# Patient Record
Sex: FEMALE | Race: BLACK OR AFRICAN AMERICAN | Employment: OTHER | URBAN - METROPOLITAN AREA
[De-identification: names, ages, dates, MRNs, and addresses within clinical notes are randomized per-mention and may not be internally consistent; named-entity substitution may affect disease eponyms.]

---

## 2024-03-18 ENCOUNTER — HOME HEALTH ADMISSION (OUTPATIENT)
Age: 59
End: 2024-03-18
Payer: MEDICAID

## 2024-03-20 ENCOUNTER — HOME CARE VISIT (OUTPATIENT)
Age: 59
End: 2024-03-20

## 2024-03-21 ENCOUNTER — HOME CARE VISIT (OUTPATIENT)
Age: 59
End: 2024-03-21
Payer: MEDICAID

## 2024-03-21 VITALS
OXYGEN SATURATION: 96 % | HEART RATE: 90 BPM | DIASTOLIC BLOOD PRESSURE: 78 MMHG | TEMPERATURE: 97.2 F | SYSTOLIC BLOOD PRESSURE: 136 MMHG | RESPIRATION RATE: 14 BRPM

## 2024-03-21 PROCEDURE — G0299 HHS/HOSPICE OF RN EA 15 MIN: HCPCS

## 2024-03-21 ASSESSMENT — ENCOUNTER SYMPTOMS
BOWEL INCONTINENCE: 1
HEMOPTYSIS: 0
STOOL DESCRIPTION: SOFT FORMED

## 2024-03-22 NOTE — HOME HEALTH
Skilled services/Home bound verification:  CAREGIVER UNDERSTAND HOMEBOUND STATUS.         Skilled Reason for admission/summary of clinical condition:   Chronic venous htn w/ulcer RLE /eft Buttocks Pressure injury/ Tube feedings, Patient is NPO   This patient is homebound for the following reasons Requires considerable and taxing effort to leave the home , Requires the assistance of 1 or more persons to leave the home , Only leaves the home for medical reasons or [employer] services and are infrequent and of short duration for other reasons  and Patient is bedridden .    Caregiver: Main Caregiver is debbie veloz .Assists with ADLs, Medication management, Transportation to appointments, Tube Feeds 3x/day,  and assists with Transfers with martir lift, turning and positioning.         Medications reconciled and all medications are available in the home this visit.      The following education was provided regarding medications:  Needs the assistance of one other for safe ambulation.  Medications  are somewhat effective at this time.      High risk medication teaching regarding antiplatelets and  hypoglycemic agents performed.  Patient is on ASA an Antiplatelet,  I discussed the signs of gabriella and occult bleeding, prevention of bleeding when taking antiplatelets. and when to call MD.  Patient also takes both Lantus and Novolog for her diabetes.  I went over the signs and symptoms of hyper/hypoglycemia and the importance of rotating injections.  I also stressed the importance of using a sharps container for used needles and discard with top tightly closed.  Instruct Patient in insulin purpose, side effects, possible complications, administration procedure, and schedule. Instruct on safe handling and storage of insulin and on safe disposal of needles and testing equipment.  I went over the difference between the two insulins.      Home health supplies by type and quantity ordered/delivered this visit include:I brought

## 2024-03-22 NOTE — CASE COMMUNICATION
OC done on [facility].  She is bedbound, last 4-5 months.  She has wounds right calf and left Buttocks.  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  Patient has gastrostomy tube for all feedings/meds.

## 2024-03-25 ENCOUNTER — HOME CARE VISIT (OUTPATIENT)
Age: 59
End: 2024-03-25
Payer: MEDICAID

## 2024-03-25 PROCEDURE — G0300 HHS/HOSPICE OF LPN EA 15 MIN: HCPCS

## 2024-03-27 ENCOUNTER — HOME CARE VISIT (OUTPATIENT)
Age: 59
End: 2024-03-27
Payer: MEDICAID

## 2024-03-27 VITALS
RESPIRATION RATE: 18 BRPM | HEART RATE: 89 BPM | TEMPERATURE: 97 F | DIASTOLIC BLOOD PRESSURE: 74 MMHG | OXYGEN SATURATION: 95 % | SYSTOLIC BLOOD PRESSURE: 128 MMHG

## 2024-03-27 PROCEDURE — G0300 HHS/HOSPICE OF LPN EA 15 MIN: HCPCS

## 2024-03-27 ASSESSMENT — ENCOUNTER SYMPTOMS
BOWEL INCONTINENCE: 1
BOWEL INCONTINENCE: 1

## 2024-03-27 NOTE — HOME HEALTH
Skilled Needs: Education and Wound Care   Caregiver involvement: Pt independent with care with the exception of wound treatment due to location   Medications reviewed and all medications are available in the home this visit.    The following education was provided regarding medications:  DIDI BROUSSARD notified of any discrepancies/look a-like medications/medication interactions: DIDI  Medications are effecrtive at this time.    Home health supplies by type and quantity ordered/delivered this visit include:  Supplies available   Patient education provided this visit:  Reviewed to reported any redness, swelling, warmth, fever, chills, increased heart/respiratory rate, uncontrolled pain/tenderness, drainage:green/yellow/brown, or odor to MD immediately.  Alejandro education provided: DIDI  Patient level of understanding of education provided: Verbalzied all understanding to above education  Skilled Care Performed this visit: Education and Wound Care  Patient response to procedure performed: Minimal pain during dressing change   Agency Progress toward goals: Progressing toward interventions above  Patient's Progress towards personal goals: when patient reaches goals and medication is managed, and disease processes are understood patient agrees and understand that discharge will take place

## 2024-03-29 ENCOUNTER — HOME CARE VISIT (OUTPATIENT)
Age: 59
End: 2024-03-29
Payer: MEDICAID

## 2024-03-29 VITALS
RESPIRATION RATE: 18 BRPM | SYSTOLIC BLOOD PRESSURE: 124 MMHG | DIASTOLIC BLOOD PRESSURE: 70 MMHG | HEART RATE: 78 BPM | OXYGEN SATURATION: 98 %

## 2024-03-29 PROCEDURE — G0300 HHS/HOSPICE OF LPN EA 15 MIN: HCPCS

## 2024-03-29 ASSESSMENT — ENCOUNTER SYMPTOMS: BOWEL INCONTINENCE: 1

## 2024-03-29 NOTE — HOME HEALTH
Skilled Needs: Education and Wound Care   Caregiver involvement: Pt dependent with care with due to DX   Medications reviewed and all medications are available in the home this visit.    The following education was provided regarding medications:  DIDI BROUSSARD notified of any discrepancies/look a-like medications/medication interactions: DIDI  Medications are effecrtive at this time.    Home health supplies by type and quantity ordered/delivered this visit include:  Supplies available   Patient education provided this visit:  Reviewed to reported any redness, swelling, warmth, fever, chills, increased heart/respiratory rate, uncontrolled pain/tenderness, drainage:green/yellow/brown, or odor to MD immediately. Wound care completed to right leg CG completed treatment to her bottom during clean up and brief change. Glucose level 145 per CG at this visit. Pt has supplies. Pt is due to go to the Wound clinic on monday set schedule for next week.   Sharps education provided: Clinician instructed patient/CG on proper disposal of sharps: Containers should be made of hard plastic, be puncture-resistant and leakproof, such as a laundry detergent or bleach bottle.  When the container is ¾ full, it should be sealed with tape and labeled DO NOT RECYCLE prior to discarding in the regular trash.    Patient level of understanding of education provided: Verbalzied all understanding to above education  Skilled Care Performed this visit: Education and Wound Care  Patient response to procedure performed: Minimal pain during dressing change   Agency Progress toward goals: Progressing toward interventions above  Patient's Progress towards personal goals: when patient reaches goals and medication is managed, and disease processes are understood patient agrees and understand that discharge will take place

## 2024-04-01 ENCOUNTER — HOME CARE VISIT (OUTPATIENT)
Age: 59
End: 2024-04-01
Payer: MEDICAID

## 2024-04-03 ENCOUNTER — HOME CARE VISIT (OUTPATIENT)
Age: 59
End: 2024-04-03
Payer: MEDICAID

## 2024-04-03 VITALS
HEART RATE: 98 BPM | SYSTOLIC BLOOD PRESSURE: 122 MMHG | RESPIRATION RATE: 18 BRPM | DIASTOLIC BLOOD PRESSURE: 74 MMHG | OXYGEN SATURATION: 90 %

## 2024-04-03 PROCEDURE — G0300 HHS/HOSPICE OF LPN EA 15 MIN: HCPCS

## 2024-04-03 ASSESSMENT — ENCOUNTER SYMPTOMS: BOWEL INCONTINENCE: 1

## 2024-04-04 NOTE — HOME HEALTH
Skilled Needs: Education and Wound Care   Caregiver involvement: Pt dependent with care due to dx and location of wounds    Medications reviewed and all medications are available in the home this visit.    The following education was provided regarding medications:  DIDI BROUSSARD notified of any discrepancies/look a-like medications/medication interactions: DIDI  Medications are effecrtive at this time.    Home health supplies by type and quantity ordered/delivered this visit include:  Supplies available   Patient education provided this visit:  Reviewed to reported any redness, swelling, warmth, fever, chills, increased heart/respiratory rate, uncontrolled pain/tenderness, drainage:green/yellow/brown, or odor to MD immediately. PT seen by wound clinic on monday.   Alejandro education provided: DIDI  Patient level of understanding of education provided: Verbalzied all understanding to above education  Skilled Care Performed this visit: Education and Wound Care  Patient response to procedure performed: Minimal pain during dressing change   Agency Progress toward goals: Progressing toward interventions above  Patient's Progress towards personal goals: when patient reaches goals and medication is managed, and disease processes are understood patient agrees and understand that discharge will take place

## 2024-04-05 ENCOUNTER — HOME CARE VISIT (OUTPATIENT)
Age: 59
End: 2024-04-05
Payer: MEDICAID

## 2024-04-05 VITALS
RESPIRATION RATE: 18 BRPM | OXYGEN SATURATION: 98 % | SYSTOLIC BLOOD PRESSURE: 108 MMHG | DIASTOLIC BLOOD PRESSURE: 68 MMHG | TEMPERATURE: 98 F | HEART RATE: 76 BPM

## 2024-04-05 PROCEDURE — G0494 LPN CARE EA 15MIN HH/HOSPICE: HCPCS

## 2024-04-05 PROCEDURE — G0495 RN CARE TRAIN/EDU IN HH: HCPCS

## 2024-04-05 PROCEDURE — G0300 HHS/HOSPICE OF LPN EA 15 MIN: HCPCS

## 2024-04-05 NOTE — HOME HEALTH
Skilled reason for visit: Education/Wound care    Caregiver involvement: Pt dependent in care with the exception of wound due to location     Medications reviewed and all medications are available in the home this visit.    The following education was provided regarding medications:  N/A  MD notified of any discrepancies/look a-like medications/medication interactions: N/A  Medications are effective at this time.  Y    Home health supplies by type and quantity ordered/delivered this visit include: Supplies available    Patient education provided this visit: educated to increase protein intake to help with wound healing.   Sharps education provided: NA    Patient level of understanding of education provided: PT verbalized understanding of all education provided by repeating back high protein diet needs     Patient response to procedure performed:  effective    Agency Progress toward goals: Progressising towards goals from start of care    Patient's Progress towards personal goals: Patient continues wound clinic visits and progresses towards goals    Home exercise program: N/A    Continued need for the following skills: Nursing and wound care    Plan for next visit: Wound care and education    Patient and/or caregiver notified and agrees to changes in the Plan of Care: yes    The following discharge planning was discussed with the pt/caregiver: N/A

## 2024-04-08 ENCOUNTER — HOME CARE VISIT (OUTPATIENT)
Age: 59
End: 2024-04-08
Payer: MEDICAID

## 2024-04-08 PROCEDURE — G0300 HHS/HOSPICE OF LPN EA 15 MIN: HCPCS

## 2024-04-09 VITALS
RESPIRATION RATE: 18 BRPM | SYSTOLIC BLOOD PRESSURE: 110 MMHG | HEART RATE: 98 BPM | OXYGEN SATURATION: 95 % | DIASTOLIC BLOOD PRESSURE: 88 MMHG | TEMPERATURE: 97.3 F

## 2024-04-09 NOTE — HOME HEALTH
Skilled reason for visit: Education/Wound care    Caregiver involvement: Pt dependent in care with the exception of wound due to location     Medications reviewed and all medications are available in the home this visit.    The following education was provided regarding medications:  N/A  MD notified of any discrepancies/look a-like medications/medication interactions: N/A  Medications are effective at this time.  Y    Home health supplies by type and quantity ordered/delivered this visit include: Supplies available    Patient education provided this visit: educted to increase protein intake to help with wound healing. Educated on pressure relief for wound healing.     Sharps education provided: NA    Patient level of understanding of education provided: PT caregiver verbalized understanding of all education provided by repeating back high protein diet needs     Patient response to procedure performed:  effective    Agency Progress toward goals: Progressising towards goals from start of care    Patient's Progress towards personal goals: Patient continues wound clinic visits and progresses towards goals    Home exercise program: N/A    Continued need for the following skills: Nursing and wound care    Plan for next visit: Wound care and education    Patient and/or caregiver notified and agrees to changes in the Plan of Care: yes     The following discharge planning was discussed with the pt/caregiver: N/A

## 2024-04-10 ENCOUNTER — HOME CARE VISIT (OUTPATIENT)
Age: 59
End: 2024-04-10
Payer: MEDICAID

## 2024-04-15 ENCOUNTER — HOME CARE VISIT (OUTPATIENT)
Age: 59
End: 2024-04-15
Payer: MEDICAID

## 2024-04-18 ENCOUNTER — HOME CARE VISIT (OUTPATIENT)
Age: 59
End: 2024-04-18
Payer: MEDICAID

## 2024-04-18 VITALS
DIASTOLIC BLOOD PRESSURE: 78 MMHG | TEMPERATURE: 96 F | SYSTOLIC BLOOD PRESSURE: 132 MMHG | RESPIRATION RATE: 16 BRPM | HEART RATE: 95 BPM | OXYGEN SATURATION: 95 %

## 2024-04-18 PROCEDURE — G0300 HHS/HOSPICE OF LPN EA 15 MIN: HCPCS

## 2024-04-18 ASSESSMENT — ENCOUNTER SYMPTOMS: BOWEL INCONTINENCE: 1

## 2024-04-18 NOTE — HOME HEALTH
Skilled Needs: Education and Wound Care   Caregiver involvement: Pt independent with care with the exception of wound treatment due to location   Medications reviewed and all medications are available in the home this visit.    The following education was provided regarding medications:  DIDI BROUSSARD notified of any discrepancies/look a-like medications/medication interactions: DIDI  Medications are effecrtive at this time.    Home health supplies by type and quantity ordered/delivered this visit include:  Supplies available   Patient education provided this visit:  Reviewed to reported any redness, swelling, warmth, fever, chills, increased heart/respiratory rate, uncontrolled pain/tenderness, drainage:green/yellow/brown, or odor to MD immediately. WOund care compeleted as ordered with miniml pain verbalziex at this visit. No change in medication or wound care treatment at this time Pt due to see wound clinic [facility] on the 29th of April.  Sharps education provided: Clinician instructed patient/CG on proper disposal of sharps: Containers should be made of hard plastic, be puncture-resistant and leakproof, such as a laundry detergent or bleach bottle.  When the container is ¾ full, it should be sealed with tape and labeled DO NOT RECYCLE prior to discarding in the regular trash.    Patient level of understanding of education provided: Verbalzied all understanding to above education  Skilled Care Performed this visit: Education and Wound Care  Patient response to procedure performed: Minimal pain during dressing change   Agency Progress toward goals: Progressing toward interventions above  Patient's Progress towards personal goals: when patient reaches goals and medication is managed, and disease processes are understood patient agrees and understand that discharge will take place

## 2024-04-22 ENCOUNTER — HOME CARE VISIT (OUTPATIENT)
Age: 59
End: 2024-04-22
Payer: MEDICAID

## 2024-04-22 VITALS
HEART RATE: 98 BPM | OXYGEN SATURATION: 96 % | TEMPERATURE: 97 F | RESPIRATION RATE: 16 BRPM | DIASTOLIC BLOOD PRESSURE: 70 MMHG | SYSTOLIC BLOOD PRESSURE: 128 MMHG

## 2024-04-22 PROCEDURE — G0300 HHS/HOSPICE OF LPN EA 15 MIN: HCPCS

## 2024-04-22 ASSESSMENT — ENCOUNTER SYMPTOMS: BOWEL INCONTINENCE: 1

## 2024-04-22 NOTE — HOME HEALTH
Skilled Needs: Education and Wound Care   Caregiver involvement: Pt dependent in care due to DX   Medications reviewed and all medications are available in the home this visit.    The following education was provided regarding medications:  DIDI BROUSSARD notified of any discrepancies/look a-like medications/medication interactions: NA  Medications are effecrtive at this time.    Home health supplies by type and quantity ordered/delivered this visit include:  Supplies available   Patient education provided this visit:  Reviewed to reported any redness, swelling, warmth, fever, chills, increased heart/respiratory rate, uncontrolled pain/tenderness, drainage:green/yellow/brown, or odor to MD immediately. Wound care completed a ordered. Minimal pain verbalzied during dressing change.   Sharps education provided: Clinician instructed patient/CG on proper disposal of sharps: Containers should be made of hard plastic, be puncture-resistant and leakproof, such as a laundry detergent or bleach bottle.  When the container is ¾ full, it should be sealed with tape and labeled DO NOT RECYCLE prior to discarding in the regular trash.    Patient level of understanding of education provided: Verbalzied all understanding to above education  Skilled Care Performed this visit: Education and Wound Care  Patient response to procedure performed: Minimal pain during dressing change   Agency Progress toward goals: Progressing toward interventions above  Patient's Progress towards personal goals: when patient reaches goals and medication is managed, and disease processes are understood patient agrees and understand that discharge will take place

## 2024-04-25 ENCOUNTER — HOME CARE VISIT (OUTPATIENT)
Age: 59
End: 2024-04-25
Payer: MEDICAID

## 2024-04-25 VITALS
RESPIRATION RATE: 18 BRPM | HEART RATE: 65 BPM | DIASTOLIC BLOOD PRESSURE: 70 MMHG | TEMPERATURE: 98 F | OXYGEN SATURATION: 95 % | SYSTOLIC BLOOD PRESSURE: 120 MMHG

## 2024-04-25 PROCEDURE — G0300 HHS/HOSPICE OF LPN EA 15 MIN: HCPCS

## 2024-04-25 ASSESSMENT — ENCOUNTER SYMPTOMS: BOWEL INCONTINENCE: 1

## 2024-04-25 NOTE — HOME HEALTH
Skilled Needs: Education and Wound Care   Caregiver involvement: Pt dependent in care due to DX    Medications reviewed and all medications are available in the home this visit.    The following education was provided regarding medications:  DIDI BROUSSARD notified of any discrepancies/look a-like medications/medication interactions: DIDI  Medications are effecrtive at this time.    Home health supplies by type and quantity ordered/delivered this visit include:  Supplies available   Patient education provided this visit:  Reviewed to reported any redness, swelling, warmth, fever, chills, increased heart/respiratory rate, uncontrolled pain/tenderness, drainage:green/yellow/brown, or odor to MD immediately. Wound care completed as ordered no s/s of infection noted this visit. Pt due to see wound clinic next week. will only need nursing 1x next week.   Sharps education provided: Clinician instructed patient/CG on proper disposal of sharps: Containers should be made of hard plastic, be puncture-resistant and leakproof, such as a laundry detergent or bleach bottle.  When the container is ¾ full, it should be sealed with tape and labeled DO NOT RECYCLE prior to discarding in the regular trash.      Patient level of understanding of education provided: Verbalzied all understanding to above education  Skilled Care Performed this visit: Education and Wound Care  Patient response to procedure performed: Minimal pain during dressing change   Agency Progress toward goals: Progressing toward interventions above  Patient's Progress towards personal goals: when patient reaches goals and medication is managed, and disease processes are understood patient agrees and understand that discharge will take place

## 2024-04-27 ENCOUNTER — HOME CARE VISIT (OUTPATIENT)
Age: 59
End: 2024-04-27
Payer: MEDICAID

## 2024-04-29 ENCOUNTER — HOME CARE VISIT (OUTPATIENT)
Age: 59
End: 2024-04-29
Payer: MEDICAID

## 2024-05-09 ENCOUNTER — HOME CARE VISIT (OUTPATIENT)
Age: 59
End: 2024-05-09
Payer: MEDICAID

## 2024-05-11 ENCOUNTER — HOME CARE VISIT (OUTPATIENT)
Age: 59
End: 2024-05-11
Payer: MEDICAID

## 2024-05-11 PROCEDURE — G0299 HHS/HOSPICE OF RN EA 15 MIN: HCPCS

## 2024-05-12 VITALS
OXYGEN SATURATION: 94 % | SYSTOLIC BLOOD PRESSURE: 142 MMHG | TEMPERATURE: 97 F | HEART RATE: 68 BPM | RESPIRATION RATE: 16 BRPM | DIASTOLIC BLOOD PRESSURE: 84 MMHG

## 2024-05-13 NOTE — HOME HEALTH
Summary of clinical health condition:   59 /yo female re-admitted to: [facility] 4/26/24-5/6/2024 Dx: Acute metabolic encephalopathy with uncontrolled diabetes type 2.    This patient is homebound for the following reasons Requires considerable and taxing effort to leave the home  and Only leaves the home for medical reasons or [employer] services and are infrequent and of short duration for other reasons.    Caregiver: CG/mother, PCA, other family members who assist with ADL's, assist with daily medications, prepare daily meals, run errands, groceries and accompanied  MD appt. prn.     Skilled care provided: Teaching disease and medication management, performed coccyx, R buttock and RLL wound care (see woun addendum), Lyons cath (5/7) draining with good amt of clear  yellow/urine, PEG tube site clean and intact, CG/mother & PCA independent with PEG tube care and bolus feeding. Completed JORDAN assessment, SNV freq and D/C plan.   ( Aquacel Ag continued from hospital treatment used 5/7/24).  Patient response to procedure performed: Pt tolerated well during the assessment procedure with no c/o.    Medications reviewed and all medications are available at home. The following education was provided regarding medications, medication interactions, and look a like medications: N/A, all med's reviewed. Discussed importance of timely taking all prescribed med's, proper dosage and freq. Discussed s/e with Aspirin; abdominal cramps, bloody or black and tarry stool, bloody/dark urine, chest pain discomfort, confusion and constipation. CG to notify MD if any of these s/s present. Medications are somewhat effective at this time.     High risk medication teaching regarding anticoagulants, hyperglycemic agents or opoid narcotics performed (specify): Antiplatelet teaching provided as mentioned above.    MD notified of any discrepancies/look a like medications/medication interactions N/A    Home health supplies by type and quantity

## 2024-05-14 ENCOUNTER — HOME CARE VISIT (OUTPATIENT)
Age: 59
End: 2024-05-14
Payer: MEDICAID

## 2024-05-14 PROCEDURE — G0300 HHS/HOSPICE OF LPN EA 15 MIN: HCPCS

## 2024-05-15 VITALS
OXYGEN SATURATION: 95 % | RESPIRATION RATE: 16 BRPM | DIASTOLIC BLOOD PRESSURE: 78 MMHG | HEART RATE: 106 BPM | SYSTOLIC BLOOD PRESSURE: 120 MMHG

## 2024-05-15 ASSESSMENT — ENCOUNTER SYMPTOMS: BOWEL INCONTINENCE: 1

## 2024-05-15 NOTE — HOME HEALTH
Skilled Needs: Education and Wound Care   Caregiver involvement: Pt dependent in all care due to DX    Medications reviewed and all medications are available in the home this visit.    The following education was provided regarding medications:  DIDI BROUSSARD notified of any discrepancies/look a-like medications/medication interactions: DIDI  Medications are effecrtive at this time.    Home health supplies by type and quantity ordered/delivered this visit include:  Supplies available   Patient education provided this visit:  Reviewed to reported any redness, swelling, warmth, fever, chills, increased heart/respiratory rate, uncontrolled pain/tenderness, drainage:green/yellow/brown, or odor to MD immediately. Pt currently has a jacobsen and wounds on her backside and her right leg. Pt has supplies at this time for wound care. CG continues to monitor glucose levels and administers medication as needed.   Sharps education provided: Clinician instructed patient/CG on proper disposal of sharps: Containers should be made of hard plastic, be puncture-resistant and leakproof, such as a laundry detergent or bleach bottle.  When the container is ¾ full, it should be sealed with tape and labeled DO NOT RECYCLE prior to discarding in the regular trash.    Patient level of understanding of education provided: Verbalzied all understanding to above education  Skilled Care Performed this visit: Education and Wound Care  Patient response to procedure performed: Minimal pain during dressing change   Agency Progress toward goals: Progressing toward interventions above  Patient's Progress towards personal goals: when patient reaches goals and medication is managed, and disease processes are understood patient agrees and understand that discharge will take place

## 2024-05-17 ENCOUNTER — HOME CARE VISIT (OUTPATIENT)
Age: 59
End: 2024-05-17
Payer: MEDICAID

## 2024-05-17 VITALS
TEMPERATURE: 98.5 F | SYSTOLIC BLOOD PRESSURE: 128 MMHG | RESPIRATION RATE: 18 BRPM | DIASTOLIC BLOOD PRESSURE: 72 MMHG | OXYGEN SATURATION: 98 % | HEART RATE: 67 BPM

## 2024-05-17 PROCEDURE — G0300 HHS/HOSPICE OF LPN EA 15 MIN: HCPCS

## 2024-05-17 ASSESSMENT — ENCOUNTER SYMPTOMS: BOWEL INCONTINENCE: 1

## 2024-05-18 ENCOUNTER — HOME CARE VISIT (OUTPATIENT)
Age: 59
End: 2024-05-18
Payer: MEDICAID

## 2024-05-18 PROCEDURE — G0299 HHS/HOSPICE OF RN EA 15 MIN: HCPCS

## 2024-05-19 VITALS
TEMPERATURE: 98 F | SYSTOLIC BLOOD PRESSURE: 110 MMHG | HEART RATE: 100 BPM | RESPIRATION RATE: 18 BRPM | OXYGEN SATURATION: 94 % | DIASTOLIC BLOOD PRESSURE: 68 MMHG

## 2024-05-19 NOTE — HOME HEALTH
Physician Notification and Justification to Continue Services:     Justification for continued intermittent care: patient with wound care concerns as follows requiring skilled care for wound care, PEG tube and Beyer care/management.    Cassie Patel NP notified of the following: the need for continued Skilled Nursing home health services for above reasons, plan of care including visit frequency of 3w8, 2 prn Skilled Nursing for : additional assessment and education on wound care/management, PEG tube care, disease and medication management, medication concerns as follows: administering insulin on timely basis and monitoring BG and additional assessment for wound improvement, healing complication including BEYER cath and monitor for s/s. Service to continue q M/W/F.      Skilled reason for visit: Assessment for recert,  disease and medication management,  Medications reviewed and all listed medications are available at home.  The following education was provided regarding medications, medication interactions, and look a like medications: all med's reviewed. Discussed importance of timely taking all med's, proper dosage and freq. Medications are effective at this time.     Caregiver: CG/mother/ PCA who assist with daily meals, ADL's,  assist/ provided reminders with daily medication, run errands groceries and accompany to MD appt.prn      Patient education provided this visit to include: Reviewed infection prevention; hand washing, using hand  when touching contaminated items and avoiding sick person. Repositioning q 2 hrs and elevating RLE above the heart to avoid touching wound site. Keeping PEG tube site and clean and change dressing daily. Elevating HOB 45 degrees during bolus feeding. Monitor daily BG before bolus feeding, continue feeding 4x/daily with freed H20 200 ml bet bolus. Continue to monitor for S/S of infection; fever 100.4, increase pain, redness, increased swelling, coughing with yellow thick

## 2024-05-21 ENCOUNTER — HOME CARE VISIT (OUTPATIENT)
Age: 59
End: 2024-05-21
Payer: MEDICAID

## 2024-05-21 PROCEDURE — G0299 HHS/HOSPICE OF RN EA 15 MIN: HCPCS

## 2024-05-21 ASSESSMENT — ENCOUNTER SYMPTOMS: HEMOPTYSIS: 0

## 2024-05-22 VITALS
RESPIRATION RATE: 18 BRPM | SYSTOLIC BLOOD PRESSURE: 108 MMHG | HEART RATE: 110 BPM | OXYGEN SATURATION: 98 % | TEMPERATURE: 98 F | DIASTOLIC BLOOD PRESSURE: 70 MMHG

## 2024-05-22 ASSESSMENT — ENCOUNTER SYMPTOMS: STOOL DESCRIPTION: LOOSE

## 2024-05-23 ENCOUNTER — HOME CARE VISIT (OUTPATIENT)
Age: 59
End: 2024-05-23
Payer: MEDICAID

## 2024-05-23 PROCEDURE — G0299 HHS/HOSPICE OF RN EA 15 MIN: HCPCS

## 2024-05-23 ASSESSMENT — ENCOUNTER SYMPTOMS: BOWEL INCONTINENCE: 1

## 2024-05-23 NOTE — HOME HEALTH
Skilled reason for visit: Assessment for recert, disease and medication management,    Medications reviewed and all listed medications are available at home.  The following education was provided regarding medications, medication interactions, and look a like medications: all med's reviewed. Discussed importance of timely taking all med's, proper dosage and freq. Medications are effective at this time.     Caregiver: CG/mother/ PCA who assist with daily meals, ADL's,  assist/ provided reminders with daily medication, run errands groceries and accompany to MD appt.prn   Patient education provided this visit to include: Reviewed infection prevention; hand washing, using hand  when touching contaminated items and avoiding sick person. Reviewed repositioning q 2 hrs and elevating RLE above the heart to avoid touching wound site. CG to change sacral wound dressing when soiled prn. Monitor daily BG before bolus feeding, continue feeding 4x/daily with freed H20 200 ml bet bolus. Continue to monitor for S/S of infection; fever 100.4, increase pain, redness, increased swelling, coughing with yellow thick sputum, cloudy urine with strong odor, purulent wound drainage with foul smell, not feeling well 2-3 days, SOB, and to call [facility] or MD for assistance if experiencing any of these S/S. To call 911 with chest pains, facial drooping, difficulty talking, non arousable/unconscious and uncontrollable bleeding.    Sharps education provided: Completed teaching.  Patient/caregiver degree of understanding: Pt/CG has good understanding of the teaching provided during visit.    Skilled care provided: Completed assessment, performed PEG tube care, changed RLL wound and sacral wound dressing (see wound addendum). Wound measured and photographed.  Pt. tolerated well during the assessment and procedure with no C/O.   Continue skilled care to provide: SN  Home health supplies by type and quantity ordered/delivered this visit

## 2024-05-25 ENCOUNTER — HOME CARE VISIT (OUTPATIENT)
Age: 59
End: 2024-05-25
Payer: MEDICAID

## 2024-05-25 VITALS
DIASTOLIC BLOOD PRESSURE: 82 MMHG | SYSTOLIC BLOOD PRESSURE: 132 MMHG | HEART RATE: 78 BPM | RESPIRATION RATE: 18 BRPM | OXYGEN SATURATION: 97 % | TEMPERATURE: 97 F

## 2024-05-25 PROCEDURE — G0299 HHS/HOSPICE OF RN EA 15 MIN: HCPCS

## 2024-05-25 ASSESSMENT — ENCOUNTER SYMPTOMS
HEMOPTYSIS: 0
BOWEL INCONTINENCE: 1

## 2024-05-25 NOTE — HOME HEALTH
Skilled reason for visit: Assessment, disease and medication management, Lyons cath changed, RLL and sacral wound care.     Medications reviewed and all listed medications are available at home. The following education was provided regarding medications, medication interactions, and look a like medications: all med's reviewed. Discussed importance of timely taking all med's, proper dosage and freq. Medications are effective at this time.      Caregiver: CG/mother/ PCA who assist with daily meals, ADL's, assist/ provided reminders with daily medication, run errands groceries and accompany to MD appt.prn     Patient education provided this visit to include: Reviewed infection prevention; hand washing, using hand  when touching contaminated items and avoiding sick person. Reviewed proper Lyons cath care, repositioning q 2 hrs and elevating RLE above the heart to avoid touching wound site. CG to change sacral wound dressing when soiled prn. Monitor daily BG before bolus feeding, continue feeding 4x/daily with freed H20 200 ml bet bolus. Continue to monitor for S/S of infection; fever 100.4, increase pain, redness, increased swelling, coughing with yellow thick sputum, cloudy urine with strong odor, purulent wound drainage with foul smell, not feeling well 2-3 days, SOB, and to call [facility] or MD for assistance if experiencing any of these S/S. To call 911 with chest pains, facial drooping, difficulty talking, non arousable/unconscious and uncontrollable bleeding.    Sharps education provided: Completed teaching.    Patient/caregiver degree of understanding: Pt/CG has good understanding of the teaching provided during visit.    Skilled care provided: Completed assessment, Lyons cath changed 16 fr with 10 ml NS baloon following sterile technique procedure, performed PEG tube care, changed RLL wound and sacral wound dressing (see wound addendum).  Pt health condition stable and s/s of infection.    Pt. tolerated well

## 2024-05-26 VITALS
HEART RATE: 98 BPM | TEMPERATURE: 97 F | DIASTOLIC BLOOD PRESSURE: 79 MMHG | OXYGEN SATURATION: 97 % | RESPIRATION RATE: 18 BRPM | SYSTOLIC BLOOD PRESSURE: 110 MMHG

## 2024-05-27 ENCOUNTER — HOME CARE VISIT (OUTPATIENT)
Age: 59
End: 2024-05-27
Payer: MEDICAID

## 2024-05-27 VITALS
RESPIRATION RATE: 15 BRPM | SYSTOLIC BLOOD PRESSURE: 116 MMHG | HEART RATE: 95 BPM | DIASTOLIC BLOOD PRESSURE: 70 MMHG | TEMPERATURE: 97.6 F | OXYGEN SATURATION: 95 %

## 2024-05-27 PROCEDURE — G0299 HHS/HOSPICE OF RN EA 15 MIN: HCPCS

## 2024-05-27 ASSESSMENT — ENCOUNTER SYMPTOMS: BOWEL INCONTINENCE: 1

## 2024-05-27 NOTE — HOME HEALTH
Skilled reason for visit: wound care and peg care  daughter reported that peg tube has been leaking since end of last week. upon assessment multiple split 4 x4 heavily saturated with what appears to be the Glucerna. the daughter reported that bolus feeding was given prior to visit & throughout the weekend. was in bed at 90 degrees upon arrival and daughter reported that HOB stays at 90 degrees. right below left Breast/Left upper abdomen small wound as skin slid off when removed saturated towel that daughter had been covering peg tube d/t leaking. cleansed area and applied DSD. sacral area  not addressed as 911 was called and sent to hospital.  Caregiver involvement: daughter perform tube feeding, ADLS/MED and jacobsen management.    Medications reviewed and all medications are available in the home this visit.    The following education was provided regarding medications:  reviewed all medication bottles in home for frequency, side effects and precautions. verbalized understanding and repeat back        Medications are effective at this time.      Home health supplies by type and quantity ordered/delivered this visit include: wound care supplies in the home    Patient education provided this visit:see intervention tab.      Patient level of understanding of education provided: see intervention tab.      Patient response to procedure performed:  tolerated wound care with noticeable grimace of RLE wound care.    Agency Progress toward goals: see intervention tab for progressing toward goals        Patient's Progress towards personal goals: see intervention tab for progressing toward goals        Home exercise program: go to ER for peg eval 911.    Continued need for the following skills: Nursing.    Plan for next visit: wound care    Patient and/or caregiver notified and agrees to changes in the Plan of Care: N/A.     The following discharge planning was discussed with the pt/caregiver: when all jacobsen/wound care/peg

## 2024-05-29 ENCOUNTER — HOME CARE VISIT (OUTPATIENT)
Age: 59
End: 2024-05-29
Payer: MEDICAID

## 2024-06-07 ENCOUNTER — HOME CARE VISIT (OUTPATIENT)
Age: 59
End: 2024-06-07
Payer: MEDICAID

## 2024-06-07 VITALS
RESPIRATION RATE: 22 BRPM | DIASTOLIC BLOOD PRESSURE: 72 MMHG | SYSTOLIC BLOOD PRESSURE: 116 MMHG | TEMPERATURE: 98.9 F | OXYGEN SATURATION: 96 % | HEART RATE: 98 BPM

## 2024-06-07 PROCEDURE — G0299 HHS/HOSPICE OF RN EA 15 MIN: HCPCS

## 2024-06-07 ASSESSMENT — ENCOUNTER SYMPTOMS
BOWEL INCONTINENCE: 1
TROUBLE SWALLOWING: 1

## 2024-06-08 ENCOUNTER — HOME CARE VISIT (OUTPATIENT)
Age: 59
End: 2024-06-08
Payer: MEDICAID

## 2024-06-08 NOTE — HOME HEALTH
Skilled reason for visit: [facility]MIT ON [facility] 24-24 SEPSIS \"\"This is a 58-year-old female who has a history of stroke intracranial hemorrhage in 2023, nonverbal due to Stroke, residual left-sided hemiplegia presents with left-sided chest pain. Patient is point to the left side of her chest. Pain appears to be reproducible on exam. History is unobtainable as patient is nonverbal. Will obtain labs including troponin x 2, CTA of her chest. Patient had a recent negative nuclear stress test I n 2023. Troponin x 2 is negative. CT of the chest shows chronic pulmonary emboli nothing acute. Also noted malposition gastric tube. This was replaced in the ED. I had a long discussion with the patient's mother regarding anticoagulation. Given her intracranial hemorrhage in 2023 there is some risk to restarting her anticoagulation. At this time mother wishes to defer starting anticoagulation will follow-up with her primary care physician. Of note the patient is tachycardic 100-110. On the records reviewed patient was recent discharge from the hospital with a heart rate of approximate 100 so this appears to be baseline for her. \" Everything is back to normal per Aidee. Rash on tongue is gone. Wound is on the right calf. Missed 2024 due to 's , but new appointment with [facility] on 3/18/2024. May have to go weekly if no one is coming to the house.\"    Caregiver involvement: daughter and mother performs all ADLS/tube feeding turning. will learn wound care and iv administration. nurse demonstrated iv CADD pump bag change 3 time and demonstrated t flush red port daily with NS/heparin and infection control before connecting with mother. . verbalized understanding but needs reinforcement. mother reported \"my daughter is probably a better learner. will be present for tomorrow visit.      Medications reviewed and all medications are available in the home this visit.    The following education was

## 2024-06-09 ENCOUNTER — HOME CARE VISIT (OUTPATIENT)
Age: 59
End: 2024-06-09
Payer: MEDICAID

## 2024-06-09 PROCEDURE — G0299 HHS/HOSPICE OF RN EA 15 MIN: HCPCS

## 2024-06-10 ENCOUNTER — HOME CARE VISIT (OUTPATIENT)
Age: 59
End: 2024-06-10
Payer: MEDICAID

## 2024-06-10 ENCOUNTER — HOSPITAL ENCOUNTER (OUTPATIENT)
Facility: HOSPITAL | Age: 59
Setting detail: SPECIMEN
Discharge: HOME | End: 2024-06-13

## 2024-06-10 VITALS
TEMPERATURE: 98.7 F | HEART RATE: 97 BPM | OXYGEN SATURATION: 99 % | SYSTOLIC BLOOD PRESSURE: 125 MMHG | DIASTOLIC BLOOD PRESSURE: 50 MMHG | RESPIRATION RATE: 16 BRPM

## 2024-06-10 LAB — SENTARA SPECIMEN COLLECTION: NORMAL

## 2024-06-10 PROCEDURE — 99001 SPECIMEN HANDLING PT-LAB: CPT

## 2024-06-10 PROCEDURE — G0299 HHS/HOSPICE OF RN EA 15 MIN: HCPCS

## 2024-06-10 ASSESSMENT — ENCOUNTER SYMPTOMS
TROUBLE SWALLOWING: 1
BOWEL INCONTINENCE: 1

## 2024-06-10 NOTE — HOME HEALTH
Skilled reason for visit: wound/IV care and disease teaching    Caregiver involvement: mother and niece provide all ADLS, enteral feeding, jacobsen and med management    Medications reviewed and all medications are available in the home this visit.    The following education was provided regarding medications:  reviewed all medication bottles in home for frequency, side effects and precautions. verbalized understanding        Medications are effective, too early to assess effectiveness (antibiotics). at this time.      Home health supplies by type and quantity ordered/delivered this visit include: wound/IV/jacobsen supplies in the home    Patient education provided this visit: see intervention tab.          Patient level of understanding of education provided: see intervention tab.      Patient response to procedure performed:  during wound care moaning and facial grimacing.        went back to ER after visit due to wound declining odor and measurements increased. Undermining around the entire wound. upon arrival feces was in the wound and nijovan Hoskins was educated on the importance of keeping the wound dressing clean and free of stool. nijovan reported that \"doing her best when cleaning up from feces.\" demonstrated wound care to the coccyx and was advised to change wound if has a bowelmovent.verbalized understanding      Continued need for the following skills: Nursing.    Plan for next visit: wound care    Patient and/or caregiver notified and agrees to changes in the Plan of Care: N/A.     The following discharge planning was discussed with the pt/caregiver: Will discharge when all wound/IV care disease process, complication and dietary goals are met and understands all medication purpose/frequencies/side effects

## 2024-06-11 ENCOUNTER — HOME CARE VISIT (OUTPATIENT)
Age: 59
End: 2024-06-11
Payer: MEDICAID

## 2024-06-12 VITALS
OXYGEN SATURATION: 97 % | RESPIRATION RATE: 20 BRPM | DIASTOLIC BLOOD PRESSURE: 80 MMHG | SYSTOLIC BLOOD PRESSURE: 130 MMHG | TEMPERATURE: 98 F | HEART RATE: 100 BPM

## 2024-06-12 ASSESSMENT — ENCOUNTER SYMPTOMS
STOOL DESCRIPTION: LOOSE
HEMOPTYSIS: 0

## 2024-06-12 NOTE — HOME HEALTH
Skilled reason for visit: 59-year old female who is being seen due to sepsis, has jacobsen catheter, peg tube, PICC line and multiple wounds.  Patient's neice is changing out IV medication bag every 24 hours, not present during this visit, but mother states that she is doing okay with that.  Wound care performed, patient showed signs of discomfort when turning.  Had incontinent episode during visit.  Mother stated that they are doing good with peg tube administration.    Caregiver involvement: Mother and other family members, able to assist with any needs that may arise-ADL's, medications, meal preparation, transportation to MD appointments, PICC care, total care required.  Medications reviewed and all medications are available in the home this visit.    The following education was provided regarding medications:  side effects,dosages, purposes, frequencies (patient knowledgeable regarding medications).    MD notified of any discrepancies/look a-like medications/medication interactions: No issues found  Medications are effective at this time.    Home health supplies by type and quantity ordered/delivered this visit include: Admission nurse ordered supplies, supplies in home as well.  Patient education provided this visit:   INSTRUCTED PATIENT AND CG THAT SHOULD ANY NEEDS OR CONCERNS ARISE TO FIRST CALL OUR OFFICE, OR THE DR'S OFFICE  OR GO TO AN URGENT CARE CENTER AND NOT TO THE ED FOR NON-LIFE THREATENING EVENTS. IF IT IS LIFE THREATENING THEN CALL 911 OR GO TO THE CLOSEST ER.  Patient is a fall risk. Educated pateint to sit on the side of the chair/bed, take a slow deep breaths, have feet firmly planted before standing up, use cane/walker if available, or have someone to assist.  encouraged patient to get three nutritional meals daily and to stay hydrated, drink plenty of fluids.  patient made aware to monitor for s/s of infection [increased swelling, increased redness around site, increased pain, foul smelling

## 2024-06-20 ENCOUNTER — HOME CARE VISIT (OUTPATIENT)
Age: 59
End: 2024-06-20
Payer: MEDICAID

## 2024-06-20 PROCEDURE — G0299 HHS/HOSPICE OF RN EA 15 MIN: HCPCS

## 2024-06-21 ENCOUNTER — HOME CARE VISIT (OUTPATIENT)
Age: 59
End: 2024-06-21
Payer: MEDICAID

## 2024-06-21 VITALS
TEMPERATURE: 97.8 F | OXYGEN SATURATION: 94 % | DIASTOLIC BLOOD PRESSURE: 62 MMHG | HEART RATE: 104 BPM | SYSTOLIC BLOOD PRESSURE: 120 MMHG

## 2024-06-21 VITALS
SYSTOLIC BLOOD PRESSURE: 100 MMHG | OXYGEN SATURATION: 94 % | TEMPERATURE: 97 F | RESPIRATION RATE: 18 BRPM | HEART RATE: 98 BPM | DIASTOLIC BLOOD PRESSURE: 76 MMHG

## 2024-06-21 PROCEDURE — G0151 HHCP-SERV OF PT,EA 15 MIN: HCPCS

## 2024-06-21 ASSESSMENT — ENCOUNTER SYMPTOMS
HEMOPTYSIS: 0
BOWEL INCONTINENCE: 1

## 2024-06-21 NOTE — HOME HEALTH
Summary of clinical health condition: 58 y/o female readmitted dx: Sepsis (6/20/24)  Skilled reason for visit: Disease and medication management, IV/Ampicillin teaching/management, PEG tube care, Lyons care/management, sacral, RLL & back wound care.    Caregiver involvement: Primary caregiver/mother & niece is available to assist with ADL's,  administer with daily medications, assist with tube feeding, able to assist with wound care during non nursing days and accompany to MD appt prn. Medications reconciled and all medications listed  are available in the home this visit. (waiting for pain medication prescription to be picked up from pharmacy).  The following education was provided regarding medications:  Taking pain medication on timely basis to achieve good pain relief and discussed importance of compliance with medication.  MD notified of any discrepancies/look a-like medications/medication interactions: N/A  Medications are somewhat effective at this time.    Home health supplies by type and quantity ordered/delivered this visit include: Foam dressing, Silvasorb gel, Lyons cath, gauze pads.  Patient education provided this visit: Patient education provided this visit to include: Infection control; hand washing, taught/demonstrated how to infused IV/Amoxicillin  IV med and how to flush PICC line port with 10 ml NS before and after infusing. Teaching includes, hand washing/gloving, preparing medical supplies in clean space and proper disposal of waste. CG to change PEG tube dressing daily, reviewed Lyons cath care. CG to continue wound care daily to sacral during non-nursing days. Discussed importance of repositioning q 2 hrs and keeping spencer-area dry and clean. Monitor for S/S of infection; fever 100.4, increase pain, redness, swelling, productive coughing with yellow thick sputum, purulent wound drainage with foul smell, dark, cloudy urine with foul smell, not feeling well 2-3 days, SOB, and to call [facility] or MD

## 2024-06-21 NOTE — HOME HEALTH
primary  care physician. Of note the patient is tachycardic 100-110. On the records reviewed patient was recent discharge from the hospital with a heart rate of approximate 100 so this appears to be baseline for her. \"   Everything is back to normal per Aidee. Rash on tongue is gone. Wound is on the right calf. Missed 2024 due to 's , but new appointment with [facility] on 3/18/2024. May have to go weekly if no one is coming to the house.   List of Comorbidities:   CAD (coronary artery disease)   COVID-19 10/30/2021   CVA (cerebral vascular accident) ([facility])   Reportedly has had 10 strokes in the last ten years   Depression   Diabetes mellitus ([facility])   HTN (hypertension)   Hypokalemia 2020   Morbid obesity ([facility]) 2023   Seizure ([facility]) 2013   Sepsis ([facility]) 2024   Stage 3a chronic kidney disease ([facility]) 2023   Urinary tract infection 2023   Wound of right leg\"    Caregiver: patient lives with her family in a one story home with ramp access at the front door. Patient is dependent for all care at this time. She has a family member as her main caregiver and she assists with all care including meals, ADLs, mobility, medications and arranging medical tranportation    Medications reconciled and all medications are available in the home this visit.  The following education was provided regarding medications, medication interactions, and look a like medications: Meds are effective at this time. no discrepancies noted    Home health supplies ordered/delivered this visit include: none    Patient education provided: safety, fall risk, HEP, need for position changes/pressure relief.  Patient/caregiver degree of understanding:good    Home exercise program: changes positions/provde pressure relief every 2 hours  passive ROM as tolerated to B UE and LE    Pt/Caregiver instructed on plan of care and are agreeable to plan of care      Plan of care called to attending MD: Kohlerman, Nicholas,

## 2024-06-22 ENCOUNTER — HOME CARE VISIT (OUTPATIENT)
Age: 59
End: 2024-06-22
Payer: MEDICAID

## 2024-06-22 PROCEDURE — G0299 HHS/HOSPICE OF RN EA 15 MIN: HCPCS

## 2024-06-23 VITALS
TEMPERATURE: 97 F | HEART RATE: 64 BPM | OXYGEN SATURATION: 93 % | DIASTOLIC BLOOD PRESSURE: 78 MMHG | RESPIRATION RATE: 18 BRPM | SYSTOLIC BLOOD PRESSURE: 100 MMHG

## 2024-06-24 ENCOUNTER — HOSPITAL ENCOUNTER (OUTPATIENT)
Facility: HOSPITAL | Age: 59
Setting detail: SPECIMEN
Discharge: HOME | End: 2024-06-27

## 2024-06-24 ENCOUNTER — HOME CARE VISIT (OUTPATIENT)
Age: 59
End: 2024-06-24
Payer: MEDICAID

## 2024-06-24 VITALS
DIASTOLIC BLOOD PRESSURE: 68 MMHG | RESPIRATION RATE: 19 BRPM | HEART RATE: 99 BPM | SYSTOLIC BLOOD PRESSURE: 100 MMHG | TEMPERATURE: 97.5 F | OXYGEN SATURATION: 98 %

## 2024-06-24 LAB — SENTARA SPECIMEN COLLECTION: NORMAL

## 2024-06-24 PROCEDURE — G0299 HHS/HOSPICE OF RN EA 15 MIN: HCPCS

## 2024-06-24 PROCEDURE — 99001 SPECIMEN HANDLING PT-LAB: CPT

## 2024-06-24 ASSESSMENT — ENCOUNTER SYMPTOMS
TROUBLE SWALLOWING: 1
BOWEL INCONTINENCE: 1

## 2024-06-25 NOTE — HOME HEALTH
Skilled reason for visit: IV and wound care/ peg assessment    Caregiver involvement: mother and niece perform wound care, IV bag change, jacobsen management, peg tube care/administration.  was advised that if wound continues wit foul odor to go to ER for wound eval. verbalized understanding    Medications reviewed and all medications are available in the home this visit.    The following education was provided regarding medications:  reviewed all medication bottles in home for frequency, side effects and precautions. verbalized understanding and repeat back        Medications are effective at this time.      Home health supplies by type and quantity ordered/delivered this visit include: wound care and IV/Peg tube supplies in the home.    Patient education provided this visit: see intervention tab.          Patient level of understanding of education provided: see intervention tab.      Patient response to procedure performed:  IV care tolerate well and wound care moaning and hollering in pain during wound care. niece will give Hydrocodone-acetaminophen after home care visit. reported that has enough for one more dose.     Agency Progress toward goals: see intervention tab for progressing toward goals        Patient's Progress towards personal goals:see intervention tab for progressing toward goals        Home exercise program: cont to take all medications/diet as prescribed, follow pressure ulcer precautions, follow all fall precautions and use any assistive devices recommended by therapy or medical providers, reported any abnormal s/sx of infection to MD immediately,perform wound/IV care as prescribed. disease process teaching packets/ home care booklet for reference. call home care for any concerns/questions. keep all medical appts.      Continued need for the following skills: Nursing.    Plan for next visit: wound care    Patient and/or caregiver notified and agrees to changes in the Plan of Care: N/A.     The

## 2024-06-26 ENCOUNTER — HOME CARE VISIT (OUTPATIENT)
Age: 59
End: 2024-06-26
Payer: MEDICAID

## 2024-06-26 VITALS — DIASTOLIC BLOOD PRESSURE: 70 MMHG | SYSTOLIC BLOOD PRESSURE: 134 MMHG | RESPIRATION RATE: 16 BRPM

## 2024-06-26 PROCEDURE — G0300 HHS/HOSPICE OF LPN EA 15 MIN: HCPCS

## 2024-06-28 ENCOUNTER — HOME CARE VISIT (OUTPATIENT)
Age: 59
End: 2024-06-28
Payer: MEDICAID

## 2024-06-28 VITALS
OXYGEN SATURATION: 97 % | SYSTOLIC BLOOD PRESSURE: 135 MMHG | DIASTOLIC BLOOD PRESSURE: 60 MMHG | TEMPERATURE: 97.5 F | HEART RATE: 96 BPM | RESPIRATION RATE: 16 BRPM

## 2024-06-28 PROCEDURE — G0299 HHS/HOSPICE OF RN EA 15 MIN: HCPCS

## 2024-06-28 PROCEDURE — G0152 HHCP-SERV OF OT,EA 15 MIN: HCPCS

## 2024-06-28 ASSESSMENT — ENCOUNTER SYMPTOMS: BOWEL INCONTINENCE: 1

## 2024-06-28 NOTE — HOME HEALTH
Caregiver involvement: Aidee (granddaughter) and pt's mother live with pt and provide daily assistance with all mobility and self care for > 3 years.    Medications reviewed and all medications are available in the home this visit.    The following education was provided regarding medications, medication interactions, and look alike medications (specify): Continue as directed by MD.    Medications  are effective at this time.      Patient education provided this visit: see ADL note    Sharps education provided:  Clinician instructed patient/CG on proper disposal of sharps: Containers should be made of hard plastic, be puncture-resistant and leakproof, such as a laundry detergent or bleach bottle.  When the container is ¾ full, it should be sealed with tape and labeled DO NOT RECYCLE prior to discarding in the regular trash.        Patient level of understanding of education provided: see ADL note    Skilled Care Performed this visit: Completed OT evaluation and assessment for safety with ADL and mobility.    Modified Barthel ADL Index  Bowels              (x ) 0 = Incontinent or needs enemas              ( ) 1 = Occasional Accident              ( ) 2 = Continent  Bladder               (x ) 0 = Incontinent  jacobsen catheter              ( ) 1 = Occasional accident (1x/wk)              ( ) 2 = Continent  Grooming               (x ) 0 = Needs help with personal care              ( ) 1 = Independent (including face, hair, teeth, shaving)  Toilet Use               (x ) 0 = Dependent              ( ) 1 = Needs some help              ( ) 2 = Independent  Feeding              (x ) 0 = Unable  peg tube              ( ) 1 = Needs help, e.g. cutting              ( ) 2 = Independent  Transfers   (bed to chair and back)              (x ) 0 = Unable, no sitting balance              ( ) 1 = Major help (1 or 2 people), can sit              ( ) 2 = Minor help (verbal or physical)              ( ) 3 =

## 2024-06-28 NOTE — HOME HEALTH
Wound Care  Patient response to procedure performed: Minimal pain during dressing change   Agency Progress toward goals: Progressing toward interventions above  Patient's Progress towards personal goals: when patient reaches goals and medication is managed, and disease processes are understood patient agrees and understand that discharge will take place

## 2024-06-29 ENCOUNTER — HOME CARE VISIT (OUTPATIENT)
Age: 59
End: 2024-06-29
Payer: MEDICAID

## 2024-06-29 PROCEDURE — G0299 HHS/HOSPICE OF RN EA 15 MIN: HCPCS

## 2024-06-30 VITALS
DIASTOLIC BLOOD PRESSURE: 68 MMHG | SYSTOLIC BLOOD PRESSURE: 118 MMHG | HEART RATE: 76 BPM | TEMPERATURE: 97.5 F | OXYGEN SATURATION: 96 % | RESPIRATION RATE: 18 BRPM

## 2024-07-01 ENCOUNTER — HOME CARE VISIT (OUTPATIENT)
Age: 59
End: 2024-07-01
Payer: MEDICAID

## 2024-07-01 PROCEDURE — G0299 HHS/HOSPICE OF RN EA 15 MIN: HCPCS

## 2024-07-01 NOTE — CASE COMMUNICATION
Occupational Therapy Evaluation    1w1    Ms. Voss is not ambulatory.  She is bed bound and has a hospital bed with pressure relief mattress.  Family reports independence with use of the martir to transfer pt to the w/c as needed and denies needing further DME training.  Her niece is present today and reports that she is the pt's paid caregiver.  She is able to independently assist pt with all ADL at the bed level.  Pt requires tot al care from family for grooming, bathing, dressing, toileting and feeding.  Pt has a feeding tube and jacobsen catheter.  Niece reports that pt has been given opportunity to assist with tasks such as washing her face; however, she is not thorough.  Therefore, family must assist.  Niece reports that pt often cries out when care is provided.  This was observed while niece was washing pt's face this morning.  Pt began to cry out and required  increased time to settle down.  Niece reports that pt has been this way for years since family removed her from a nursing home and brought her home to live with them.  Niece reports that pt had multiple falls while in the nursing home so she thinks pt has anxiety that make her upset easily.  Attempted to assess pt's UB range of motion and mobility.  Pt attempted to hit and scratch therapist.  Niece held pt's R hand for safety while ass essment was performed.  Noted increased swelling in the L UE/hand.  Instructed niece to keep the L hand elevated when possible to help reduce edema.  Also suggested gentle ROM to the L UE to include shoulder flexion/extension while in sidelying, elbow flexion/extension, supination/pronation, wrist flexion/extension and finger flexion/extension.  Recommended working distal to proximal and having another caregiver present to hold pt's R h and and help keep her calm through gentle touch and talking.  Her niece was receptive to this and agreeable.  She reports that they are used to having 2 caregivers working with pt

## 2024-07-02 VITALS
TEMPERATURE: 97.8 F | HEART RATE: 134 BPM | OXYGEN SATURATION: 96 % | DIASTOLIC BLOOD PRESSURE: 90 MMHG | RESPIRATION RATE: 26 BRPM | SYSTOLIC BLOOD PRESSURE: 147 MMHG

## 2024-07-02 ASSESSMENT — ENCOUNTER SYMPTOMS
BOWEL INCONTINENCE: 1
STOOL DESCRIPTION: LOOSE
TROUBLE SWALLOWING: 1

## 2024-07-02 NOTE — HOME HEALTH
Skilled reason for visit: wound care/IV care  was sent to ER due to heart rate in 130's-140's, coccyx wound smelled foul, and moaning throughout the visit with observed &nodding that was in pain. was covered in very foul smelling stool upon arrival from vaginal area (including jacobsen) to coccyx wound. assisted niece in cleaning the feces prior to wound care to be sent to hospital.    Caregiver involvement: niece and mother provides ADLS/wound care/IV administration/peg tube. suspect non compliance in performing wound care non nursing day as reported and being left alone due to unable to get in home on Friday 6/28/2024 (mother reported after speaking to later in the day that was left alone due to niece having to take her children to school). contact APS to update of home care concerns in the home with the family not able to manage care/living alone d/t to high acuity of care needed. Spoke to Judy at [facility] who reported that worker went out to home after initial report and was in the home alone with an 8 year old. they have been trying to coordinate a day to speak with the family and mother agreed to 7/3 but will now go to hospital to finish APS eval.    Medications reviewed and all medications are available in the home this visit.    The following education was provided regarding medications:  reviewed all medication bottles in home for frequency, side effects and precautions. verbalized understanding and repeat back        Medications are effective, unsure if antibiotics are effective sent to ER suspected sepsis at this time.      Home health supplies by type and quantity ordered/delivered this visit include: wound/IV/peg tube supplies in the home.    Patient education provided this visit:see intervention tab.        Patient level of understanding of education provided:see intervention tab.      Patient response to procedure performed: niece set up IV administration and dose was infusing during the visit and

## 2024-07-03 ENCOUNTER — HOME CARE VISIT (OUTPATIENT)
Age: 59
End: 2024-07-03
Payer: MEDICAID

## 2024-07-03 VITALS
DIASTOLIC BLOOD PRESSURE: 88 MMHG | HEART RATE: 69 BPM | TEMPERATURE: 98.3 F | SYSTOLIC BLOOD PRESSURE: 136 MMHG | RESPIRATION RATE: 20 BRPM | OXYGEN SATURATION: 95 %

## 2024-07-03 PROCEDURE — G0299 HHS/HOSPICE OF RN EA 15 MIN: HCPCS

## 2024-07-03 ASSESSMENT — ENCOUNTER SYMPTOMS
DIARRHEA: 1
TROUBLE SWALLOWING: 1
STOOL DESCRIPTION: LOOSE

## 2024-07-05 ENCOUNTER — HOME CARE VISIT (OUTPATIENT)
Age: 59
End: 2024-07-05
Payer: MEDICAID

## 2024-07-05 PROCEDURE — G0300 HHS/HOSPICE OF LPN EA 15 MIN: HCPCS

## 2024-07-08 ENCOUNTER — HOSPITAL ENCOUNTER (OUTPATIENT)
Facility: HOSPITAL | Age: 59
Setting detail: SPECIMEN
Discharge: HOME | End: 2024-07-11

## 2024-07-08 ENCOUNTER — HOME CARE VISIT (OUTPATIENT)
Age: 59
End: 2024-07-08
Payer: MEDICAID

## 2024-07-08 VITALS
HEART RATE: 75 BPM | TEMPERATURE: 98.4 F | SYSTOLIC BLOOD PRESSURE: 135 MMHG | RESPIRATION RATE: 18 BRPM | OXYGEN SATURATION: 97 % | DIASTOLIC BLOOD PRESSURE: 50 MMHG

## 2024-07-08 LAB — SENTARA SPECIMEN COLLECTION: NORMAL

## 2024-07-08 PROCEDURE — G0299 HHS/HOSPICE OF RN EA 15 MIN: HCPCS

## 2024-07-08 PROCEDURE — 99001 SPECIMEN HANDLING PT-LAB: CPT

## 2024-07-08 NOTE — HOME HEALTH
Skilled reason for visit: Wound Care    Caregiver involvement: Niece cares for patient and performs wound care on non nursing days.     Medications reviewed and all medications are available in the home this visit.  Y  The following education was provided regarding medications:  N/A  MD notified of any discrepancies/look a-like medications/medication interactions: N/A  Medications are effective at this time.  Y    Home health supplies by type and quantity ordered/delivered this visit include: Supplies in home    Patient education provided this visit: Infection prevention to wounds. Monitoring wounds for increase in drainage, swelling, increased redness and/or warmth to areas and notifying a provider if change in condition occurs.    Sharps education provided: N/A    Patient level of understanding of education provided: Patient is not able to verbalize understanding, caregiver educated and verbalizes understanding of infection prevention and monitoring wound for signs and symptoms of infection.    Patient response to procedure performed:  Patient grimaced and moaned during wound care to coccyx. Patient stopped onced wound care was completed.    Agency Progress toward goals: Agency continues visits at ordered frequency to provide wound care at ordered frequency.    Patient's Progress towards personal goals: Patient continues homecare visits at ordered frequency to provide wound wound care and patient education.    Home exercise program: N/A    Continued need for the following skills: Wound care    Plan for next visit: Wound care    Patient and/or caregiver notified and agrees to changes in the Plan of Care: yes

## 2024-07-09 VITALS
RESPIRATION RATE: 18 BRPM | TEMPERATURE: 97.7 F | DIASTOLIC BLOOD PRESSURE: 80 MMHG | OXYGEN SATURATION: 100 % | SYSTOLIC BLOOD PRESSURE: 130 MMHG | HEART RATE: 95 BPM

## 2024-07-09 ASSESSMENT — ENCOUNTER SYMPTOMS
TROUBLE SWALLOWING: 1
STOOL DESCRIPTION: LIQUID
DIARRHEA: 1

## 2024-07-09 NOTE — HOME HEALTH
Skilled reason for visit: PICC line dressing change, wound care, lab draw from PICC Line    Caregiver involvement: Mother present during visit    Medications reviewed and all medications are available in the home this visit.    The following education was provided regarding medications:  take medications as prescribed.    MD notified of any discrepancies/look a-like medications/medication interactions: NA  Medications are unable to assess effectiveness at this time.      Home health supplies by type and quantity ordered/delivered this visit include: Dakin's left in home, supplied ordered foam for upper back incision, abd pads, gloves, foam dressings for buttocks.    Patient education provided this visit: cg concerned with swelling in hands, encouraged to elevate on pillows when on side to assist with edema control.    Sharps education provided: no education needed    Patient level of understanding of education provided: patient nods head but unable to tell if she understands, Caregiver level of understanding of repositioning every 2 hours verbalizes understanding.    Patient response to procedure performed: cried out with each dressing change and any position change    Agency Progress toward goals: wounds are slowing progressing        Home exercise program: turn and reposition every 2 hours    Continued need for the following skills: Nursing.    Plan for next visit: wound care    Patient and/or caregiver notified and agrees to changes in the Plan of Care: N/A.     The following discharge planning was discussed with the pt/caregiver: wounds healed or wounds closed by graft.

## 2024-07-10 ENCOUNTER — HOME CARE VISIT (OUTPATIENT)
Age: 59
End: 2024-07-10
Payer: MEDICAID

## 2024-07-10 VITALS
RESPIRATION RATE: 16 BRPM | OXYGEN SATURATION: 96 % | SYSTOLIC BLOOD PRESSURE: 170 MMHG | HEART RATE: 67 BPM | TEMPERATURE: 97 F | DIASTOLIC BLOOD PRESSURE: 80 MMHG

## 2024-07-10 PROCEDURE — G0300 HHS/HOSPICE OF LPN EA 15 MIN: HCPCS

## 2024-07-10 ASSESSMENT — ENCOUNTER SYMPTOMS: BOWEL INCONTINENCE: 1

## 2024-07-12 ENCOUNTER — HOME CARE VISIT (OUTPATIENT)
Age: 59
End: 2024-07-12
Payer: MEDICAID

## 2024-07-12 PROCEDURE — G0300 HHS/HOSPICE OF LPN EA 15 MIN: HCPCS

## 2024-07-14 VITALS
SYSTOLIC BLOOD PRESSURE: 116 MMHG | RESPIRATION RATE: 18 BRPM | TEMPERATURE: 97.7 F | OXYGEN SATURATION: 97 % | DIASTOLIC BLOOD PRESSURE: 67 MMHG | HEART RATE: 98 BPM

## 2024-07-14 NOTE — HOME HEALTH
Skilled reason for visit: Wound Care    Caregiver involvement: in home caregiver    Medications reviewed and all medications are available in the home this visit.  Y  The following education was provided regarding medications:  N/A  MD notified of any discrepancies/look a-like medications/medication interactions: N/A  Medications are effective at this time.  Y    Home health supplies by type and quantity ordered/delivered this visit include: Supplies in home    Patient education provided this visit: Infection prevention, wound care    Sharps education provided: N/A    Patient level of understanding of education provided: Caregiver verbalizes understanding of teaching without difficulties. Carla made aware that patient's picc line is d/c awaiting removal by RN and that patient's antibiotics is d/c. Caregiver verbalized understanding without difficulties.    Patient response to procedure performed:  Patient grimaced during wound care but stopped once wound care completed and offloaded from area.    Agency Progress toward goals: Agency continues visits at ordered frequency to provide wound care at ordered frequency.    Patient's Progress towards personal goals: Patient continues homecare visits at ordered frequency to provide wound wound care and patient education.    Home exercise program: N/A    Continued need for the following skills: Wound care    Plan for next visit: Wound care    Patient and/or caregiver notified and agrees to changes in the Plan of Care: yes

## 2024-07-15 ENCOUNTER — HOME CARE VISIT (OUTPATIENT)
Age: 59
End: 2024-07-15
Payer: MEDICAID

## 2024-07-15 VITALS
SYSTOLIC BLOOD PRESSURE: 134 MMHG | OXYGEN SATURATION: 97 % | HEART RATE: 98 BPM | TEMPERATURE: 98.2 F | RESPIRATION RATE: 18 BRPM | DIASTOLIC BLOOD PRESSURE: 82 MMHG

## 2024-07-15 PROCEDURE — G0299 HHS/HOSPICE OF RN EA 15 MIN: HCPCS

## 2024-07-15 ASSESSMENT — ENCOUNTER SYMPTOMS
TROUBLE SWALLOWING: 1
BOWEL INCONTINENCE: 1
STOOL DESCRIPTION: MUSHY

## 2024-07-15 NOTE — HOME HEALTH
Physician Notification and Justification to Continue Services:     Justification for continued intermittent care: patient with wound care concerns as follows slow wound healing due to comorbities and multiple pressure wounds.    DR Kohlerman  (spoke to Carlos and will sign orders) notified of the following: the need for continued Skilled Nursing home health services for above reasons, plan of care including visit frequency of 1 wk,3 wk 8,2 prn Skilled Nursing for : additional assessment and WOUND: wound care services to RLE/RT BUTTOCKS/UPPER BACK/LEFT KNEE wound care    IV PICC Will be pulled on Wed 7/17/2024 as last dose of antibotic tomorrow 7/16/24 d/t missing doses. verbalized understanding importance of completing dose. daniel reported mother was giving a laxative/stool softner daily eventhough having multiple loose/soft stool since starting antibotics. stool in getting inside of sacral/buttocks wounds. couldnt locate bottle to add to the med list. was advised that laxative/stool softner not need if having regular bowelmovements as can result in infection/ dehydration. daniel reported she will make sure her grandmother/pt mother d/c laxative/stool softner. Dr Kohlerman was told.   Medications reviewed and all medications are available in the home this visit.    The following education was provided regarding medications:  reviewed all medication bottles in home for frequency, side effects and precautions. verbalized understanding and repeat back        Medications are effective at this time.      Home health supplies by type and quantity ordered/delivered this visit include: WOUND CARE SUPPLIES IN THE HOME.    Patient education provided this visit: see intervention tab.          Patient level of understanding of education provided: see intervention tab.      Patient response to procedure performed:  TOLERATED WOUND CARE WELL    Agency Progress toward goals: see intervention tab for progressing toward

## 2024-07-17 ENCOUNTER — HOME CARE VISIT (OUTPATIENT)
Age: 59
End: 2024-07-17
Payer: MEDICAID

## 2024-07-17 PROCEDURE — G0299 HHS/HOSPICE OF RN EA 15 MIN: HCPCS

## 2024-07-17 ASSESSMENT — ENCOUNTER SYMPTOMS
HEMOPTYSIS: 0
BOWEL INCONTINENCE: 1

## 2024-07-18 VITALS
RESPIRATION RATE: 18 BRPM | TEMPERATURE: 97 F | HEART RATE: 78 BPM | DIASTOLIC BLOOD PRESSURE: 76 MMHG | SYSTOLIC BLOOD PRESSURE: 110 MMHG | OXYGEN SATURATION: 97 %

## 2024-07-18 NOTE — HOME HEALTH
Skilled reason for visit: Assessment, disease and medication management, wound care, PICC line d/c.    Medications reviewed and all listed medications are available at home. The following education was provided regarding medications, medication interactions, and look a like medications: all med's reviewed. Discussed importance of timely taking all med's, proper dosage and freq. Medications are effective at this time. Added Hydromorphone and reviewed s/e.     Caregiver: CG/mother/ PCA/Aidee who assist with daily meals, ADL's, administer with daily medication, run errands groceries and accompany to MD appt.prn     Patient education provided this visit to include: Reviewed infection prevention; hand washing, using hand  when touching contaminated items and avoiding sick person. Reviewed proper Lyons cath care and importance of keeping spencer-area dry and clean. Repositioning q 2 hrs and elevating RLE above the heart to avoid touching wound site. CG to change sacral wound dressing when soiled prn. CG to continue bolus feeding on timely basis including free water 200 ml in bet bolus feeding and monitor daily BG before bolus feeding.  Continue to monitor for S/S of infection; fever 100.4, increase pain, redness, increased swelling, coughing with yellow thick sputum, cloudy urine with strong odor, purulent wound drainage with foul smell, not feeling well 2-3 days, SOB, and to call [facility] or MD for assistance if experiencing any of these S/S. To call 911 with chest pains, facial drooping, difficulty talking, non arousable/unconscious and uncontrollable bleeding.    Sharps education provided: Completed teaching.    Patient/caregiver degree of understanding: Pt/CG has good understanding of the teaching provided during visit.  Skilled care provided: Completed assessment, d/c PICC following protocol using sterile technique (order verified from [facility] pharmacy order from Manolo BROUSSARD). RLL, hips performed,

## 2024-07-19 ENCOUNTER — HOME CARE VISIT (OUTPATIENT)
Age: 59
End: 2024-07-19
Payer: MEDICAID

## 2024-07-19 PROCEDURE — G0300 HHS/HOSPICE OF LPN EA 15 MIN: HCPCS

## 2024-07-21 VITALS
TEMPERATURE: 97 F | HEART RATE: 98 BPM | SYSTOLIC BLOOD PRESSURE: 119 MMHG | RESPIRATION RATE: 18 BRPM | DIASTOLIC BLOOD PRESSURE: 71 MMHG | OXYGEN SATURATION: 98 %

## 2024-07-22 ENCOUNTER — HOME CARE VISIT (OUTPATIENT)
Age: 59
End: 2024-07-22
Payer: MEDICAID

## 2024-07-22 PROCEDURE — G0300 HHS/HOSPICE OF LPN EA 15 MIN: HCPCS

## 2024-07-24 ENCOUNTER — HOME CARE VISIT (OUTPATIENT)
Age: 59
End: 2024-07-24
Payer: MEDICAID

## 2024-07-24 PROCEDURE — G0300 HHS/HOSPICE OF LPN EA 15 MIN: HCPCS

## 2024-07-25 VITALS
TEMPERATURE: 97 F | RESPIRATION RATE: 18 BRPM | HEART RATE: 72 BPM | SYSTOLIC BLOOD PRESSURE: 138 MMHG | OXYGEN SATURATION: 98 % | DIASTOLIC BLOOD PRESSURE: 82 MMHG

## 2024-07-25 ASSESSMENT — ENCOUNTER SYMPTOMS: BOWEL INCONTINENCE: 1

## 2024-07-25 NOTE — HOME HEALTH
Skilled Needs: Education and Wound Care   Caregiver involvement: Pt dependent with care with  Medications reviewed and all medications are available in the home this visit.    The following education was provided regarding medications:  DIDI  MD notified of any discrepancies/look a-like medications/medication interactions: DIDI  Medications are effecrtive at this time.    Home health supplies by type and quantity ordered/delivered this visit include:  Supplies available   Patient education provided this visit:  Reviewed to reported any redness, swelling, warmth, fever, chills, increased heart/respiratory rate, uncontrolled pain/tenderness, drainage:green/yellow/brown, or odor to MD immediately. wound care completed images and easurements taken at visit wih the help of pts Alejandro lewis education provided: DIDI  Patient level of understanding of education provided: Verbalzied all understanding to above education  Skilled Care Performed this visit: Education and Wound Care  Patient response to procedure performed: Minimal pain during dressing change   Agency Progress toward goals: Progressing toward interventions above  Patient's Progress towards personal goals: when patient reaches goals and medication is managed, and disease processes are understood patient agrees and understand that discharge will take place

## 2024-07-25 NOTE — HOME HEALTH
Skilled reason for visit: Wound Care    Caregiver involvement: Patient's Neice and mother care for patient daily.    Medications reviewed and all medications are available in the home this visit.  Y  The following education was provided regarding medications:  N/A  MD notified of any discrepancies/look a-like medications/medication interactions: N/A  Medications are effective at this time.  Y    Home health supplies by type and quantity ordered/delivered this visit include: Supplies in home    Patient education provided this visit: Infection prevention, wound care    Sharps education provided: N/A    Patient level of understanding of education provided: Patient is unable to verbally state understanding but patient's neice who was present during visit and cares for patient verbalizes understanding of wound care evidenced by being able to correctly perform return demonstration of wound care for patient without difficulties. Patient's neice also verbalizes understanding of  signs and symptoms of infection and infection prevention evidenced by being able to teach back accurrately in own words.    Patient response to procedure performed:  Patient grimaced initally during wound care which stopped when offloaded from wound area but tolerated without any other issues.    Agency Progress toward goals: Agency continues visits at ordered frequency to provide wound care at ordered frequency.    Patient's Progress towards personal goals: Patient continues homecare visits at ordered frequency to provide wound wound care and patient education to patient and caregiver.    Home exercise program: CG educated on continuing to monitor wounds and change when soiled as ordered. As well as notifying a provider if patient exhibits signs or symptoms of infection and to call 911 if patient is experiencing a emergency/medical emergency. Also continuing to monitor patient's jacobsen urine output and color as well as peg tube site and keeping area

## 2024-07-28 ENCOUNTER — HOME CARE VISIT (OUTPATIENT)
Age: 59
End: 2024-07-28
Payer: MEDICAID